# Patient Record
Sex: FEMALE | Race: WHITE | URBAN - METROPOLITAN AREA
[De-identification: names, ages, dates, MRNs, and addresses within clinical notes are randomized per-mention and may not be internally consistent; named-entity substitution may affect disease eponyms.]

---

## 2024-03-22 ENCOUNTER — OFFICE (OUTPATIENT)
Dept: URBAN - METROPOLITAN AREA CLINIC 102 | Facility: CLINIC | Age: 79
Setting detail: OPHTHALMOLOGY
End: 2024-03-22
Payer: MEDICARE

## 2024-03-22 DIAGNOSIS — H40.1132: ICD-10-CM

## 2024-03-22 DIAGNOSIS — H26.493: ICD-10-CM

## 2024-03-22 DIAGNOSIS — Z96.1: ICD-10-CM

## 2024-03-22 DIAGNOSIS — H35.40: ICD-10-CM

## 2024-03-22 DIAGNOSIS — H35.3131: ICD-10-CM

## 2024-03-22 DIAGNOSIS — H18.413: ICD-10-CM

## 2024-03-22 PROCEDURE — 92083 EXTENDED VISUAL FIELD XM: CPT | Performed by: STUDENT IN AN ORGANIZED HEALTH CARE EDUCATION/TRAINING PROGRAM

## 2024-03-22 PROCEDURE — 92020 GONIOSCOPY: CPT | Performed by: STUDENT IN AN ORGANIZED HEALTH CARE EDUCATION/TRAINING PROGRAM

## 2024-03-22 PROCEDURE — 92133 CPTRZD OPH DX IMG PST SGM ON: CPT | Performed by: STUDENT IN AN ORGANIZED HEALTH CARE EDUCATION/TRAINING PROGRAM

## 2024-03-22 PROCEDURE — 92004 COMPRE OPH EXAM NEW PT 1/>: CPT | Performed by: STUDENT IN AN ORGANIZED HEALTH CARE EDUCATION/TRAINING PROGRAM

## 2024-03-22 PROCEDURE — 76514 ECHO EXAM OF EYE THICKNESS: CPT | Performed by: STUDENT IN AN ORGANIZED HEALTH CARE EDUCATION/TRAINING PROGRAM

## 2024-03-22 ASSESSMENT — REFRACTION_CURRENTRX
OS_OVR_VA: 20/
OS_VPRISM_DIRECTION: SV
OS_ADD: +2.50
OD_ADD: +2.50
OD_VPRISM_DIRECTION: SV
OD_OVR_VA: 20/

## 2024-04-02 ENCOUNTER — APPOINTMENT (OUTPATIENT)
Dept: OPHTHALMOLOGY | Facility: CLINIC | Age: 79
End: 2024-04-02

## 2024-04-12 ENCOUNTER — OFFICE (OUTPATIENT)
Dept: URBAN - METROPOLITAN AREA CLINIC 102 | Facility: CLINIC | Age: 79
Setting detail: OPHTHALMOLOGY
End: 2024-04-12
Payer: MEDICARE

## 2024-04-12 DIAGNOSIS — H26.492: ICD-10-CM

## 2024-04-12 PROCEDURE — 66821 AFTER CATARACT LASER SURGERY: CPT | Mod: LT | Performed by: STUDENT IN AN ORGANIZED HEALTH CARE EDUCATION/TRAINING PROGRAM

## 2024-04-13 ENCOUNTER — RX ONLY (RX ONLY)
Age: 79
End: 2024-04-13

## 2024-04-26 ENCOUNTER — OFFICE (OUTPATIENT)
Dept: URBAN - METROPOLITAN AREA CLINIC 102 | Facility: CLINIC | Age: 79
Setting detail: OPHTHALMOLOGY
End: 2024-04-26
Payer: MEDICARE

## 2024-04-26 ENCOUNTER — RX ONLY (RX ONLY)
Age: 79
End: 2024-04-26

## 2024-04-26 DIAGNOSIS — H26.491: ICD-10-CM

## 2024-04-26 PROCEDURE — 66821 AFTER CATARACT LASER SURGERY: CPT | Mod: 79,RT | Performed by: STUDENT IN AN ORGANIZED HEALTH CARE EDUCATION/TRAINING PROGRAM

## 2024-05-14 ENCOUNTER — OFFICE (OUTPATIENT)
Dept: URBAN - METROPOLITAN AREA CLINIC 102 | Facility: CLINIC | Age: 79
Setting detail: OPHTHALMOLOGY
End: 2024-05-14
Payer: MEDICARE

## 2024-05-14 DIAGNOSIS — H26.493: ICD-10-CM

## 2024-05-14 PROCEDURE — 99024 POSTOP FOLLOW-UP VISIT: CPT | Performed by: STUDENT IN AN ORGANIZED HEALTH CARE EDUCATION/TRAINING PROGRAM

## 2024-05-14 ASSESSMENT — CONFRONTATIONAL VISUAL FIELD TEST (CVF)
OS_FINDINGS: FULL
OD_FINDINGS: FULL

## 2024-07-23 ENCOUNTER — OFFICE (OUTPATIENT)
Dept: URBAN - METROPOLITAN AREA CLINIC 102 | Facility: CLINIC | Age: 79
Setting detail: OPHTHALMOLOGY
End: 2024-07-23
Payer: MEDICARE

## 2024-07-23 DIAGNOSIS — H26.493: ICD-10-CM

## 2024-07-23 DIAGNOSIS — H40.1132: ICD-10-CM

## 2024-07-23 PROCEDURE — 99213 OFFICE O/P EST LOW 20 MIN: CPT | Performed by: STUDENT IN AN ORGANIZED HEALTH CARE EDUCATION/TRAINING PROGRAM

## 2024-09-08 ENCOUNTER — EMERGENCY (EMERGENCY)
Facility: HOSPITAL | Age: 79
LOS: 0 days | Discharge: HOME | End: 2024-09-08
Attending: EMERGENCY MEDICINE
Payer: MEDICARE

## 2024-09-08 VITALS
HEIGHT: 67 IN | HEART RATE: 80 BPM | SYSTOLIC BLOOD PRESSURE: 181 MMHG | OXYGEN SATURATION: 99 % | WEIGHT: 178.57 LBS | DIASTOLIC BLOOD PRESSURE: 87 MMHG | RESPIRATION RATE: 18 BRPM | TEMPERATURE: 98 F

## 2024-09-08 VITALS
HEART RATE: 86 BPM | RESPIRATION RATE: 17 BRPM | OXYGEN SATURATION: 97 % | DIASTOLIC BLOOD PRESSURE: 99 MMHG | SYSTOLIC BLOOD PRESSURE: 157 MMHG | TEMPERATURE: 98 F

## 2024-09-08 DIAGNOSIS — M25.561 PAIN IN RIGHT KNEE: ICD-10-CM

## 2024-09-08 DIAGNOSIS — Z88.2 ALLERGY STATUS TO SULFONAMIDES: ICD-10-CM

## 2024-09-08 DIAGNOSIS — M17.11 UNILATERAL PRIMARY OSTEOARTHRITIS, RIGHT KNEE: ICD-10-CM

## 2024-09-08 PROCEDURE — 73700 CT LOWER EXTREMITY W/O DYE: CPT | Mod: MC,RT

## 2024-09-08 PROCEDURE — 76376 3D RENDER W/INTRP POSTPROCES: CPT

## 2024-09-08 PROCEDURE — 73700 CT LOWER EXTREMITY W/O DYE: CPT | Mod: 26,RT,MC

## 2024-09-08 PROCEDURE — 73562 X-RAY EXAM OF KNEE 3: CPT | Mod: RT

## 2024-09-08 PROCEDURE — 73562 X-RAY EXAM OF KNEE 3: CPT | Mod: 26,RT

## 2024-09-08 PROCEDURE — 99284 EMERGENCY DEPT VISIT MOD MDM: CPT | Mod: 25

## 2024-09-08 PROCEDURE — 76376 3D RENDER W/INTRP POSTPROCES: CPT | Mod: 26

## 2024-09-08 PROCEDURE — 99284 EMERGENCY DEPT VISIT MOD MDM: CPT

## 2024-09-08 RX ORDER — ONDANSETRON 2 MG/ML
1 INJECTION, SOLUTION INTRAMUSCULAR; INTRAVENOUS
Qty: 10 | Refills: 0
Start: 2024-09-08 | End: 2024-09-10

## 2024-09-08 RX ORDER — OXYCODONE AND ACETAMINOPHEN 7.5; 325 MG/1; MG/1
1 TABLET ORAL
Qty: 12 | Refills: 0
Start: 2024-09-08 | End: 2024-09-10

## 2024-09-08 RX ORDER — ONDANSETRON 2 MG/ML
4 INJECTION, SOLUTION INTRAMUSCULAR; INTRAVENOUS ONCE
Refills: 0 | Status: COMPLETED | OUTPATIENT
Start: 2024-09-08 | End: 2024-09-08

## 2024-09-08 RX ADMIN — ONDANSETRON 4 MILLIGRAM(S): 2 INJECTION, SOLUTION INTRAMUSCULAR; INTRAVENOUS at 09:57

## 2024-09-08 NOTE — ED PROVIDER NOTE - PATIENT PORTAL LINK FT
You can access the FollowMyHealth Patient Portal offered by [facility] by registering at the following website: [email or web address]. By joining [employer]’s FollowMyHealth portal, you will also be able to view your health information using other applications (apps) compatible with our system.

## 2024-09-08 NOTE — ED PROVIDER NOTE - PROGRESS NOTE DETAILS
Results discussed with patient and her daughter at bedside will send prescription to [facility] on [facility] in [place] and will discharge patient able to walk with knee brace B Asha PLEITEZ

## 2024-09-08 NOTE — ED PROVIDER NOTE - CLINICAL SUMMARY MEDICAL DECISION MAKING FREE TEXT BOX
Patient with acute on chronic right knee pain, unable to walk or move knee d/t pain. Will obtain XR rule out fracture.    09:45 - Patient  with XR showing severe arthritis of right knee, will obtain CT rule out occult fracture. She took NSAID for pain without relief, will give Percocet and Zofran.

## 2024-09-08 NOTE — ED ADULT NURSE REASSESSMENT NOTE - NS ED NURSE REASSESS COMMENT FT1
Patient given knee immobilizer for ambulation/standing trial. Patient reports her knee feels much improved with immobilizer on. Patient ambulatory w/o assistance, gait steady. Dr. Barry made aware. Patient to be discharged.

## 2024-09-08 NOTE — ED PROVIDER NOTE - PHYSICAL EXAMINATION
Gen:  Well appearing in NAD  Head:  NC/AT  HEENT: pupils perrl,no pharyngeal erythema, uvula midline  Cardiac: S1S2, RRR  Abd: Soft, non tender  Resp: No distress, CTA   musculoskeletal:: no deformities. Right knee with tenderness, erythema and swelling, unable to range due to pain, unable to  right knee off of stretcher.   Skin: warm and dry as visualized,  Neuro: DARBY, cn2-12, aao x 4  Psych:alert, cooperative, appropriate mood and affect for situation

## 2024-09-08 NOTE — ED PROVIDER NOTE - CARE PROVIDER_API CALL
Greg Cobos  Orthopaedic Surgery  [address] [place] [address], [address] [address]  Phone: [phone]  Fax: [phone]  Follow Up Time:

## 2024-09-08 NOTE — ED ADULT TRIAGE NOTE - CHIEF COMPLAINT QUOTE
Pt presents to ED c/o right knee pain. Pt states "I have always had problems with my right knee but this morning going down the stairs I heard and felt a snap in my right knee and since then I cannot bend it."

## 2024-09-08 NOTE — ED ADULT NURSE NOTE - OBJECTIVE STATEMENT
79 y/o female presenting to ED with c/o R knee injury. pt reports she was walking down the stairs when she heard/felt a pop. patient state she has always had problems with her right knee but this morning after walking down stairs I heard and felt a snap in my right knee and since then cannot bend it. was unable to bear weight on extremity s/p. hx of knee problems, patient thinks it is osteoarthritis. states she is very flexible and has joint problems. patient arrived to ED with knee wrapped in ACE bandage. + PMS intact. lower extremity WWP. + 2 palpable pedal pulse to affected extremity. skin color appropriate for ethnicity.

## 2024-09-08 NOTE — ED PROVIDER NOTE - NSFOLLOWUPINSTRUCTIONS_ED_ALL_ED_FT
Arthritis    You have a knee immobilizer on please use as much as you possibly can in order to walk.  Consider getting a commode at your bedside so you do not get up and fall during the night.  Also do not drive with knee immobilizer on and do not drive while taking Percocet.  As discussed you can get constipation and excessive sleepiness with Percocet please use Metamucil drink a lot of fluids.  Follow-up with a orthopedist as soon as possible  Arthritis means joint pain. It can also mean joint disease. A joint is a place where bones come together. There are more than 100 types of arthritis.    What are the causes?  Wear and tear of a joint. This is the most common cause.  Too much of a chemical called uric acid in the blood, which leads to pain in the joint (gout).  Pain and swelling (inflammation) in a joint.  Infection of a joint.  Injuries in the joint.  A reaction to medicines (allergy).  In some cases, the cause may not be known.    What are the signs or symptoms?  Pain in a joint when moving.  Redness at a joint.  Swelling at a joint.  Stiffness at a joint.  Warmth coming from the joint.  A fever.  A feeling of being sick.  How is this treated?  This condition may be treated with:  Treating the cause, if it is known.  Rest.  Raising (elevating) the joint.  Putting cold or hot packs on the joint.  Medicines to treat symptoms and reduce pain and swelling.  Shots (injections) of medicines, such as cortisone, into the joint.  You may also be told to make changes in your life, such as doing exercises and losing weight.    Follow these instructions at home:  Medicines    Take over-the-counter and prescription medicines only as told by your doctor.  Do not take aspirin for pain if your doctor says that you may have gout.  Activity    Rest your joint if your doctor tells you to.  Avoid activities that make the pain worse.  Exercise your joint regularly as told by your doctor. Try doing exercises like:  Swimming.  Water aerobics.  Biking.  Walking.  Managing pain, stiffness, and swelling    Bag of ice on a towel on the skin.  A heating pad being used on the affected area.  If told, put ice on the affected area. To do this:  Put ice in a plastic bag.  Place a towel between your skin and the bag.  Leave the ice on for 20 minutes, 2–3 times a day.  Take off the ice if your skin turns bright red. This is very important. If you cannot feel pain, heat, or cold, you have a greater risk of damage to the area.  If your joint is swollen, raise (elevate) it above the level of your heart if told by your doctor.  If your joint feels stiff in the morning, try taking a warm shower.  If told, put heat on the affected area. Do this as often as told by your doctor. Use the heat source that your doctor recommends, such as a moist heat pack or a heating pad. If you have diabetes, do not apply heat without asking your doctor. To apply heat:  Place a towel between your skin and the heat source.  Leave the heat on for 20–30 minutes.  Take off the heat if your skin turns bright red. This is very important. If you cannot feel pain, heat, or cold, you have a greater risk of getting burned.  General instructions    Maintain a healthy weight. Follow instructions from your doctor for weight control.  Do not smoke or use any products that contain nicotine or tobacco. If you need help quitting, ask your doctor.  Keep all follow-up visits.  Where to find more information  National Institutes of Health: www.niams.nih.gov  Contact a doctor if:  The pain gets worse.  You have a fever.  Get help right away if:  You have very bad pain in your joint.  You have swelling in your joint.  Your joint is red.  Many joints become painful and swollen.  You have very bad back pain.  Your leg is very weak.  Summary  Arthritis means joint pain. It can also mean joint disease. A joint is a place where bones come together.  The most common cause of this condition is wear and tear of a joint.  Symptoms of this condition include redness, swelling, or stiffness of the joint.  This condition is treated with rest, raising the joint, medicines, and putting cold or hot packs on the joint.  Follow your doctor's instructions about medicines, activity, exercises, and other home care treatments.  This information is not intended to replace advice given to you by your health care provider. Make sure you discuss any questions you have with your health care provider.    Document Revised: 09/27/2022 Document Reviewed: 09/27/2022  [employer] Patient Education © 2024 [employer]  [employer] logo  Terms and Conditions  Privacy Policy  Editorial Policy  All content on this site: Copyright © 2024 [employer], its licensors, and contributors. All rights are reserved, including those for text and data mining, AI training, and similar technologies. For all open access content, the Creative Commons licensing terms apply.  Cookies are used by this site. To decline or learn more, visit our Co

## 2024-09-13 ENCOUNTER — NON-APPOINTMENT (OUTPATIENT)
Age: 79
End: 2024-09-13

## 2024-09-13 ENCOUNTER — APPOINTMENT (OUTPATIENT)
Dept: ORTHOPEDIC SURGERY | Facility: CLINIC | Age: 79
End: 2024-09-13
Payer: MEDICARE

## 2024-09-13 VITALS — BODY MASS INDEX: 29.66 KG/M2 | WEIGHT: 178 LBS | HEIGHT: 65 IN

## 2024-09-13 DIAGNOSIS — M17.11 UNILATERAL PRIMARY OSTEOARTHRITIS, RIGHT KNEE: ICD-10-CM

## 2024-09-13 PROBLEM — Z00.00 ENCOUNTER FOR PREVENTIVE HEALTH EXAMINATION: Status: ACTIVE | Noted: 2024-09-13

## 2024-09-13 PROCEDURE — 99204 OFFICE O/P NEW MOD 45 MIN: CPT

## 2024-09-13 PROCEDURE — 73564 X-RAY EXAM KNEE 4 OR MORE: CPT | Mod: RT

## 2024-09-13 PROCEDURE — G2211 COMPLEX E/M VISIT ADD ON: CPT

## 2024-09-13 RX ORDER — MELOXICAM 15 MG/1
15 TABLET ORAL
Qty: 30 | Refills: 1 | Status: ACTIVE | COMMUNITY
Start: 2024-09-13 | End: 1900-01-01

## 2024-09-13 NOTE — PHYSICAL EXAM
[de-identified] : Right lower extremity Hip: Normal ROM without pain on IR/ER Knee Inspection: Minimal effusion Wounds: None Alignment: Neutral Palpation: tender to palpation at anterior joint line ROM active (in degrees): 0-120 with crepitus/pain through the arc of motion Ligamentous laxity: all ligaments appear stable, stable to varus stress test, stable to valgus stress test Muscle Test: good quad strength. [de-identified] : 9/13/24: Outside x-rays and CT reviewed-severe tricompartmental osteoarthritis

## 2024-09-13 NOTE — DISCUSSION/SUMMARY
[de-identified] : Severe right knee osteoarthritis  Extensive discussion of the natural history of this disease was had with the patient.  We discussed the treatment options ranging from conservative therapy which includes anti-inflammatories, steroid/HA injections, physical therapy, weight loss, knee sleeves/braces, and activity modification.  We did discuss that the ultimate treatment for arthritis is a joint replacement.  However at this time we have decided to continue with conservative treatment.   -A prescription for meloxicam with the appropriate warnings for GI, cardiac, and renal side effects was given to the patient.  Patient was instructed not to use any other NSAIDs with this medication. -Physical therapy prescription was given to the patient. We discussed our injections and if patient is interested in these she can return for reevaluation. Patient will follow-up as needed if the pain returns or does not improve.  This patient is being managed for a complex chronic issue that requires ongoing medical management. The nature of this condition requires a longitudinal relationship and monitoring over time for appropriate treatment. The patient's current medication management of their orthopedic diagnosis was reviewed today: (1) We discussed a comprehensive treatment plan that included possible pharmaceutical management involving the use of prescription strength medications including but not limited to options such as oral Ibuprofen 400mg QID, once daily Meloxicam 15 mg, or 500-650 mg Tylenol versus over the counter oral medications and topical prescription NSAID Pennsaid vs over the counter Voltaren gel. (2) There is a moderate risk of morbidity with further treatment, especially from use of prescription strength medications and possible side effects of these medications which include upset stomach with oral medications, skin reactions to topical medications and cardiac/renal issues with long term use. (3) I recommended that the patient follow-up with their medical physician to discuss any significant specific potential issues with long term medication use such as interactions with current medications or with exacerbation of underlying medical comorbidities. (4) The benefits and risks associated with use of injectable, oral or topical, prescription and over the counter anti-inflammatory medications were discussed with the patient. The patient voiced understanding of the risks including but not limited to bleeding, stroke, kidney dysfunction, heart disease, and were referred to the black box warning label for further information.

## 2024-09-13 NOTE — HISTORY OF PRESENT ILLNESS
[de-identified] : 9/13/24: Allison has been experiencing occasional knee pains but with manageable severity. Things took a turn on the preceding Sunday when she experienced intense discomfort while walking down the stairs. She finds relief in taking Advil, doing yoga, and physical therapy sessions. The present episode however is quite severe and is hindering her from performing routine activities.  However since Sunday the pain is started to improve.  Her other knee seems to be doing just fine, with occasional clicking sounds.  Allergies: Sulfa as a kid Hx of DVT/PE: Denies AC: Denies Tobacco: Denies PMH: Asthma

## 2024-11-08 ENCOUNTER — APPOINTMENT (OUTPATIENT)
Dept: ORTHOPEDIC SURGERY | Facility: CLINIC | Age: 79
End: 2024-11-08
Payer: MEDICARE

## 2024-11-08 DIAGNOSIS — M17.11 UNILATERAL PRIMARY OSTEOARTHRITIS, RIGHT KNEE: ICD-10-CM

## 2024-11-08 PROCEDURE — 99213 OFFICE O/P EST LOW 20 MIN: CPT

## 2024-11-08 PROCEDURE — G2211 COMPLEX E/M VISIT ADD ON: CPT

## 2024-12-07 ENCOUNTER — OFFICE (OUTPATIENT)
Dept: URBAN - METROPOLITAN AREA CLINIC 102 | Facility: CLINIC | Age: 79
Setting detail: OPHTHALMOLOGY
End: 2024-12-07
Payer: MEDICARE

## 2024-12-07 ENCOUNTER — RX ONLY (RX ONLY)
Age: 79
End: 2024-12-07

## 2024-12-07 DIAGNOSIS — H35.40: ICD-10-CM

## 2024-12-07 DIAGNOSIS — Z96.1: ICD-10-CM

## 2024-12-07 DIAGNOSIS — H18.413: ICD-10-CM

## 2024-12-07 DIAGNOSIS — H26.493: ICD-10-CM

## 2024-12-07 DIAGNOSIS — H40.1132: ICD-10-CM

## 2024-12-07 DIAGNOSIS — H35.3131: ICD-10-CM

## 2024-12-07 PROCEDURE — 92012 INTRM OPH EXAM EST PATIENT: CPT | Performed by: STUDENT IN AN ORGANIZED HEALTH CARE EDUCATION/TRAINING PROGRAM

## 2024-12-07 ASSESSMENT — TONOMETRY
OS_IOP_MMHG: 18
OS_IOP_MMHG: 19
OD_IOP_MMHG: 21

## 2024-12-07 ASSESSMENT — PACHYMETRY
OS_CT_CORRECTION: -2
OS_CT_UM: 572
OD_CT_CORRECTION: -2
OD_CT_UM: 575

## 2024-12-07 ASSESSMENT — CONFRONTATIONAL VISUAL FIELD TEST (CVF)
OS_FINDINGS: FULL
OD_FINDINGS: FULL

## 2024-12-09 ASSESSMENT — KERATOMETRY
OS_K1POWER_DIOPTERS: 44.25
OD_K1POWER_DIOPTERS: 44.00
OD_AXISANGLE_DEGREES: 024
OD_K2POWER_DIOPTERS: 45.00
METHOD_AUTO_MANUAL: AUTO
OS_K2POWER_DIOPTERS: 45.50
OS_AXISANGLE_DEGREES: 020

## 2024-12-09 ASSESSMENT — REFRACTION_CURRENTRX
OD_ADD: +2.50
OD_VPRISM_DIRECTION: SV
OS_VPRISM_DIRECTION: SV
OS_OVR_VA: 20/
OD_OVR_VA: 20/
OS_ADD: +2.50

## 2024-12-09 ASSESSMENT — REFRACTION_AUTOREFRACTION
OS_AXIS: 093
OD_AXIS: 110
OS_SPHERE: +0.75
OD_CYLINDER: -1.25
OS_CYLINDER: -1.75
OD_SPHERE: +1.25

## 2024-12-09 ASSESSMENT — VISUAL ACUITY
OS_BCVA: 20/30
OD_BCVA: 20/25-2

## 2025-01-03 ENCOUNTER — APPOINTMENT (OUTPATIENT)
Dept: ORTHOPEDIC SURGERY | Facility: CLINIC | Age: 80
End: 2025-01-03
Payer: MEDICARE

## 2025-01-03 DIAGNOSIS — M17.11 UNILATERAL PRIMARY OSTEOARTHRITIS, RIGHT KNEE: ICD-10-CM

## 2025-01-03 PROCEDURE — 73564 X-RAY EXAM KNEE 4 OR MORE: CPT | Mod: RT

## 2025-01-03 PROCEDURE — 99214 OFFICE O/P EST MOD 30 MIN: CPT | Mod: 25

## 2025-01-03 PROCEDURE — 20610 DRAIN/INJ JOINT/BURSA W/O US: CPT | Mod: RT

## 2025-01-03 RX ORDER — DICLOFENAC SODIUM 10 MG/G
1 GEL TOPICAL DAILY
Qty: 1 | Refills: 2 | Status: ACTIVE | COMMUNITY
Start: 2025-01-03 | End: 1900-01-01

## 2025-02-13 NOTE — ED ADULT NURSE NOTE - NS ED NOTE ABUSE RESPONSE YN
Chief Complaint   Patient presents with    Recheck Medication     Needs refills of blood pressure mediations, patient reports she is still taking losartan hydrochlorothiazide but ran out of amlodipine awhile ago and did not know she was supposed to return in summer of 2024 for a recheck    Consult For     Patient sees a psychologist who is recommending that she start taking a medication for anxiety, did bring in letter from psychologist to review with Bushra today     Pain     Has some pain in between both shoulder blade areas, no known injury, makes it hard to breath, feels like a dull aching pain, feels the pain after doing any physical activity     Pre-visit Screening:  Immunizations:  up to date  Colonoscopy:  na  Mammogram: nan  Asthma Action Test/Plan:  na  PHQ9:  given today   GAD7:  given today   Questioned patient about current smoking habits Pt. has never smoked.  Ok to leave detailed message on voice mail for today's visit only yes, phone # [phone] (home)       
Yes

## 2025-02-20 ENCOUNTER — OFFICE (OUTPATIENT)
Dept: URBAN - METROPOLITAN AREA CLINIC 12 | Facility: CLINIC | Age: 80
Setting detail: OPHTHALMOLOGY
End: 2025-02-20
Payer: MEDICARE

## 2025-02-20 DIAGNOSIS — H35.3131: ICD-10-CM

## 2025-02-20 DIAGNOSIS — H40.1132: ICD-10-CM

## 2025-02-20 DIAGNOSIS — H26.493: ICD-10-CM

## 2025-02-20 DIAGNOSIS — H18.413: ICD-10-CM

## 2025-02-20 PROCEDURE — 99213 OFFICE O/P EST LOW 20 MIN: CPT | Performed by: STUDENT IN AN ORGANIZED HEALTH CARE EDUCATION/TRAINING PROGRAM

## 2025-02-20 PROCEDURE — 92133 CPTRZD OPH DX IMG PST SGM ON: CPT | Performed by: STUDENT IN AN ORGANIZED HEALTH CARE EDUCATION/TRAINING PROGRAM

## 2025-02-20 PROCEDURE — 92081 LIMITED VISUAL FIELD XM: CPT | Performed by: STUDENT IN AN ORGANIZED HEALTH CARE EDUCATION/TRAINING PROGRAM

## 2025-02-20 ASSESSMENT — REFRACTION_CURRENTRX
OS_VPRISM_DIRECTION: SV
OD_OVR_VA: 20/
OS_OVR_VA: 20/
OS_ADD: +2.50
OD_ADD: +2.50
OD_VPRISM_DIRECTION: SV

## 2025-02-20 ASSESSMENT — PACHYMETRY
OS_CT_CORRECTION: -2
OD_CT_CORRECTION: -2
OD_CT_UM: 575
OS_CT_UM: 572

## 2025-02-20 ASSESSMENT — REFRACTION_AUTOREFRACTION
OD_CYLINDER: -1.00
OS_SPHERE: +0.50
OD_AXIS: 120
OD_SPHERE: +1.00
OS_CYLINDER: -1.25
OS_AXIS: 100

## 2025-02-20 ASSESSMENT — CONFRONTATIONAL VISUAL FIELD TEST (CVF)
OS_FINDINGS: FULL
OD_FINDINGS: FULL

## 2025-02-20 ASSESSMENT — VISUAL ACUITY
OS_BCVA: 20/30-
OD_BCVA: 20/30-

## 2025-02-20 ASSESSMENT — KERATOMETRY
METHOD_AUTO_MANUAL: AUTO
OD_AXISANGLE_DEGREES: 090
OS_K1POWER_DIOPTERS: 44.25
OD_K1POWER_DIOPTERS: 45.00
OS_K2POWER_DIOPTERS: 45.50
OS_AXISANGLE_DEGREES: 021
OD_K2POWER_DIOPTERS: 45.00

## 2025-02-20 ASSESSMENT — TONOMETRY: OS_IOP_MMHG: 20

## 2025-04-08 ENCOUNTER — APPOINTMENT (OUTPATIENT)
Dept: ORTHOPEDIC SURGERY | Facility: CLINIC | Age: 80
End: 2025-04-08
Payer: MEDICARE

## 2025-04-08 DIAGNOSIS — M17.11 UNILATERAL PRIMARY OSTEOARTHRITIS, RIGHT KNEE: ICD-10-CM

## 2025-04-08 PROCEDURE — G2211 COMPLEX E/M VISIT ADD ON: CPT

## 2025-04-08 PROCEDURE — 99213 OFFICE O/P EST LOW 20 MIN: CPT
